# Patient Record
Sex: FEMALE | Race: WHITE | NOT HISPANIC OR LATINO | Employment: UNEMPLOYED | URBAN - METROPOLITAN AREA
[De-identification: names, ages, dates, MRNs, and addresses within clinical notes are randomized per-mention and may not be internally consistent; named-entity substitution may affect disease eponyms.]

---

## 2023-03-03 ENCOUNTER — TELEPHONE (OUTPATIENT)
Dept: OBSTETRICS AND GYNECOLOGY | Facility: CLINIC | Age: 23
End: 2023-03-03
Payer: COMMERCIAL

## 2023-03-03 DIAGNOSIS — Z32.00 POSSIBLE PREGNANCY, NOT CONFIRMED: Primary | ICD-10-CM

## 2023-03-03 NOTE — TELEPHONE ENCOUNTER
Pt states she is unsure of when her last period was but she had a positive pregnancy test today. Informed pt she will need to come in on Monday 3/6 to have hcg lab drawn to try to estimate dating to schedule her new OB. Pt ABBY and will come in Monday before 1630.

## 2023-03-03 NOTE — TELEPHONE ENCOUNTER
Pt calling to schedule a new ob appt and she is unsure of lmp. She has  A faint +UPT but is concerned that the test could be old and not sure if the test was accurate.

## 2023-03-04 ENCOUNTER — APPOINTMENT (OUTPATIENT)
Dept: GENERAL RADIOLOGY | Facility: HOSPITAL | Age: 23
End: 2023-03-04
Payer: COMMERCIAL

## 2023-03-04 ENCOUNTER — HOSPITAL ENCOUNTER (OUTPATIENT)
Facility: HOSPITAL | Age: 23
Setting detail: OBSERVATION
Discharge: HOME | End: 2023-03-05
Attending: EMERGENCY MEDICINE | Admitting: INTERNAL MEDICINE
Payer: COMMERCIAL

## 2023-03-04 DIAGNOSIS — J21.8 ACUTE BRONCHIOLITIS DUE TO OTHER SPECIFIED ORGANISMS: Primary | ICD-10-CM

## 2023-03-04 DIAGNOSIS — B34.8 RHINOVIRUS INFECTION: ICD-10-CM

## 2023-03-04 DIAGNOSIS — E87.1 HYPONATREMIA: ICD-10-CM

## 2023-03-04 DIAGNOSIS — Z87.09 HISTORY OF ASTHMA: ICD-10-CM

## 2023-03-04 DIAGNOSIS — J96.01 ACUTE RESPIRATORY FAILURE WITH HYPOXIA: ICD-10-CM

## 2023-03-04 DIAGNOSIS — R06.2 WHEEZING: ICD-10-CM

## 2023-03-04 PROBLEM — J45.901 MODERATE ACUTE EXACERBATION OF ASTHMA: Status: ACTIVE | Noted: 2023-03-04

## 2023-03-04 LAB
ALBUMIN SERPL-MCNC: 3.9 G/DL (ref 3.5–5.2)
ALBUMIN/GLOB SERPL: 1.4 G/DL
ALP SERPL-CCNC: 75 U/L (ref 39–117)
ALT SERPL W P-5'-P-CCNC: 8 U/L (ref 1–33)
ANION GAP SERPL CALCULATED.3IONS-SCNC: 11 MMOL/L (ref 5–15)
AST SERPL-CCNC: 14 U/L (ref 1–32)
B PARAPERT DNA SPEC QL NAA+PROBE: NOT DETECTED
B PERT DNA SPEC QL NAA+PROBE: NOT DETECTED
B-HCG UR QL: NEGATIVE
BACTERIA UR QL AUTO: ABNORMAL /HPF
BASOPHILS # BLD AUTO: 0.07 10*3/MM3 (ref 0–0.2)
BASOPHILS # BLD AUTO: 0.07 10*3/MM3 (ref 0–0.2)
BASOPHILS NFR BLD AUTO: 0.5 % (ref 0–1.5)
BASOPHILS NFR BLD AUTO: 0.6 % (ref 0–1.5)
BILIRUB SERPL-MCNC: 0.3 MG/DL (ref 0–1.2)
BILIRUB UR QL STRIP: NEGATIVE
BUN BLDA-MCNC: 6 MG/DL
BUN BLDA-MCNC: 6 MG/DL (ref 8–26)
BUN SERPL-MCNC: 6 MG/DL (ref 6–20)
BUN/CREAT SERPL: 10.3 (ref 7–25)
C PNEUM DNA NPH QL NAA+NON-PROBE: NOT DETECTED
CA-I BLDA-SCNC: 1.25 MMOL/L (ref 1.2–1.32)
CALCIUM BLD QL: 1.25 MG/DL
CALCIUM SPEC-SCNC: 8.4 MG/DL (ref 8.6–10.5)
CHLORIDE BLDA-SCNC: 104 MMOL/L (ref 98–109)
CHLORIDE BLDA-SCNC: 104 MMOL/L (ref 98–109)
CHLORIDE SERPL-SCNC: 107 MMOL/L (ref 98–107)
CLARITY UR: ABNORMAL
CO2 BLDA-SCNC: 28 MMOL/L (ref 24–29)
CO2 SERPL-SCNC: 22 MMOL/L (ref 22–29)
COLOR UR: YELLOW
CREAT BLDA-MCNC: 0.6 MG/DL
CREAT BLDA-MCNC: 0.6 MG/DL (ref 0.6–1.3)
CREAT SERPL-MCNC: 0.58 MG/DL (ref 0.57–1)
CRP SERPL-MCNC: 0.69 MG/DL (ref 0–0.5)
D DIMER PPP FEU-MCNC: 0.27 MCGFEU/ML (ref 0–0.5)
DEPRECATED RDW RBC AUTO: 41.8 FL (ref 37–54)
DEPRECATED RDW RBC AUTO: 42.4 FL (ref 37–54)
EGFRCR SERPLBLD CKD-EPI 2021: 129.5 ML/MIN/1.73
EGFRCR SERPLBLD CKD-EPI 2021: 130.6 ML/MIN/1.73
EOSINOPHIL # BLD AUTO: 0.5 10*3/MM3 (ref 0–0.4)
EOSINOPHIL # BLD AUTO: 0.9 10*3/MM3 (ref 0–0.4)
EOSINOPHIL NFR BLD AUTO: 4.4 % (ref 0.3–6.2)
EOSINOPHIL NFR BLD AUTO: 6.9 % (ref 0.3–6.2)
ERYTHROCYTE [DISTWIDTH] IN BLOOD BY AUTOMATED COUNT: 11.9 % (ref 12.3–15.4)
ERYTHROCYTE [DISTWIDTH] IN BLOOD BY AUTOMATED COUNT: 12 % (ref 12.3–15.4)
EXPIRATION DATE: NORMAL
FLUAV SUBTYP SPEC NAA+PROBE: NOT DETECTED
FLUBV RNA ISLT QL NAA+PROBE: NOT DETECTED
GLOBULIN UR ELPH-MCNC: 2.7 GM/DL
GLUCOSE BLDC GLUCOMTR-MCNC: 107 MG/DL (ref 70–130)
GLUCOSE BLDC GLUCOMTR-MCNC: 107 MG/DL (ref 70–130)
GLUCOSE SERPL-MCNC: 112 MG/DL (ref 65–99)
GLUCOSE UR STRIP-MCNC: NEGATIVE MG/DL
HADV DNA SPEC NAA+PROBE: NOT DETECTED
HCOV 229E RNA SPEC QL NAA+PROBE: NOT DETECTED
HCOV HKU1 RNA SPEC QL NAA+PROBE: NOT DETECTED
HCOV NL63 RNA SPEC QL NAA+PROBE: NOT DETECTED
HCOV OC43 RNA SPEC QL NAA+PROBE: NOT DETECTED
HCT VFR BLD AUTO: 38.6 % (ref 34–46.6)
HCT VFR BLD AUTO: 42.4 % (ref 34–46.6)
HCT VFR BLDA CALC: 42 % (ref 38–51)
HGB BLD-MCNC: 12.8 G/DL (ref 12–15.9)
HGB BLD-MCNC: 14.1 G/DL (ref 12–15.9)
HGB BLDA-MCNC: 14.3 G/DL (ref 12–17)
HGB UR QL STRIP.AUTO: ABNORMAL
HMPV RNA NPH QL NAA+NON-PROBE: NOT DETECTED
HPIV1 RNA ISLT QL NAA+PROBE: NOT DETECTED
HPIV2 RNA SPEC QL NAA+PROBE: NOT DETECTED
HPIV3 RNA NPH QL NAA+PROBE: NOT DETECTED
HPIV4 P GENE NPH QL NAA+PROBE: NOT DETECTED
HYALINE CASTS UR QL AUTO: ABNORMAL /LPF
IMM GRANULOCYTES # BLD AUTO: 0.04 10*3/MM3 (ref 0–0.05)
IMM GRANULOCYTES # BLD AUTO: 0.04 10*3/MM3 (ref 0–0.05)
IMM GRANULOCYTES NFR BLD AUTO: 0.3 % (ref 0–0.5)
IMM GRANULOCYTES NFR BLD AUTO: 0.4 % (ref 0–0.5)
INTERNAL NEGATIVE CONTROL: NEGATIVE
INTERNAL POSITIVE CONTROL: POSITIVE
KETONES UR QL STRIP: ABNORMAL
LDH SERPL-CCNC: 283 U/L (ref 135–214)
LEUKOCYTE ESTERASE UR QL STRIP.AUTO: NEGATIVE
LYMPHOCYTES # BLD AUTO: 2.67 10*3/MM3 (ref 0.7–3.1)
LYMPHOCYTES # BLD AUTO: 5.22 10*3/MM3 (ref 0.7–3.1)
LYMPHOCYTES NFR BLD AUTO: 23.7 % (ref 19.6–45.3)
LYMPHOCYTES NFR BLD AUTO: 40.3 % (ref 19.6–45.3)
Lab: NORMAL
M PNEUMO IGG SER IA-ACNC: NOT DETECTED
MAGNESIUM SERPL-MCNC: 1.9 MG/DL (ref 1.6–2.6)
MCH RBC QN AUTO: 31.9 PG (ref 26.6–33)
MCH RBC QN AUTO: 32 PG (ref 26.6–33)
MCHC RBC AUTO-ENTMCNC: 33.2 G/DL (ref 31.5–35.7)
MCHC RBC AUTO-ENTMCNC: 33.3 G/DL (ref 31.5–35.7)
MCV RBC AUTO: 96.3 FL (ref 79–97)
MCV RBC AUTO: 96.4 FL (ref 79–97)
MONOCYTES # BLD AUTO: 0.32 10*3/MM3 (ref 0.1–0.9)
MONOCYTES # BLD AUTO: 0.72 10*3/MM3 (ref 0.1–0.9)
MONOCYTES NFR BLD AUTO: 2.8 % (ref 5–12)
MONOCYTES NFR BLD AUTO: 5.6 % (ref 5–12)
NEUTROPHILS NFR BLD AUTO: 46.4 % (ref 42.7–76)
NEUTROPHILS NFR BLD AUTO: 6.01 10*3/MM3 (ref 1.7–7)
NEUTROPHILS NFR BLD AUTO: 68.1 % (ref 42.7–76)
NEUTROPHILS NFR BLD AUTO: 7.66 10*3/MM3 (ref 1.7–7)
NITRITE UR QL STRIP: NEGATIVE
NRBC BLD AUTO-RTO: 0 /100 WBC (ref 0–0.2)
NRBC BLD AUTO-RTO: 0 /100 WBC (ref 0–0.2)
PH UR STRIP.AUTO: 5.5 [PH] (ref 5–8)
PLATELET # BLD AUTO: 302 10*3/MM3 (ref 140–450)
PLATELET # BLD AUTO: 317 10*3/MM3 (ref 140–450)
PMV BLD AUTO: 10.5 FL (ref 6–12)
PMV BLD AUTO: 10.9 FL (ref 6–12)
POTASSIUM BLDA-SCNC: 3.4 MMOL/L (ref 3.5–4.9)
POTASSIUM BLDA-SCNC: 3.4 MMOL/L (ref 3.5–4.9)
POTASSIUM SERPL-SCNC: 4 MMOL/L (ref 3.5–5.2)
PROCALCITONIN SERPL-MCNC: 0.02 NG/ML (ref 0–0.25)
PROT SERPL-MCNC: 6.6 G/DL (ref 6–8.5)
PROT UR QL STRIP: ABNORMAL
QT INTERVAL: 366 MS
QTC INTERVAL: 442 MS
RBC # BLD AUTO: 4.01 10*6/MM3 (ref 3.77–5.28)
RBC # BLD AUTO: 4.4 10*6/MM3 (ref 3.77–5.28)
RBC # UR STRIP: ABNORMAL /HPF
REF LAB TEST METHOD: ABNORMAL
RHINOVIRUS RNA SPEC NAA+PROBE: DETECTED
RSV RNA NPH QL NAA+NON-PROBE: NOT DETECTED
SARS-COV-2 RNA NPH QL NAA+NON-PROBE: NOT DETECTED
SODIUM BLD-SCNC: 124 MMOL/L (ref 138–146)
SODIUM BLD-SCNC: 141 MMOL/L (ref 138–146)
SODIUM SERPL-SCNC: 140 MMOL/L (ref 136–145)
SP GR UR STRIP: >=1.03 (ref 1–1.03)
SQUAMOUS #/AREA URNS HPF: ABNORMAL /HPF
UROBILINOGEN UR QL STRIP: ABNORMAL
WBC # UR STRIP: ABNORMAL /HPF
WBC NRBC COR # BLD: 11.26 10*3/MM3 (ref 3.4–10.8)
WBC NRBC COR # BLD: 12.96 10*3/MM3 (ref 3.4–10.8)

## 2023-03-04 PROCEDURE — 96374 THER/PROPH/DIAG INJ IV PUSH: CPT

## 2023-03-04 PROCEDURE — 25010000002 METHYLPREDNISOLONE PER 40 MG: Performed by: PHYSICIAN ASSISTANT

## 2023-03-04 PROCEDURE — 94664 DEMO&/EVAL PT USE INHALER: CPT

## 2023-03-04 PROCEDURE — 99223 1ST HOSP IP/OBS HIGH 75: CPT | Performed by: INTERNAL MEDICINE

## 2023-03-04 PROCEDURE — 99284 EMERGENCY DEPT VISIT MOD MDM: CPT

## 2023-03-04 PROCEDURE — 94799 UNLISTED PULMONARY SVC/PX: CPT

## 2023-03-04 PROCEDURE — 80053 COMPREHEN METABOLIC PANEL: CPT | Performed by: INTERNAL MEDICINE

## 2023-03-04 PROCEDURE — 94640 AIRWAY INHALATION TREATMENT: CPT

## 2023-03-04 PROCEDURE — 96376 TX/PRO/DX INJ SAME DRUG ADON: CPT

## 2023-03-04 PROCEDURE — 87086 URINE CULTURE/COLONY COUNT: CPT | Performed by: PHYSICIAN ASSISTANT

## 2023-03-04 PROCEDURE — 85379 FIBRIN DEGRADATION QUANT: CPT | Performed by: PHYSICIAN ASSISTANT

## 2023-03-04 PROCEDURE — 83615 LACTATE (LD) (LDH) ENZYME: CPT | Performed by: PHYSICIAN ASSISTANT

## 2023-03-04 PROCEDURE — G0378 HOSPITAL OBSERVATION PER HR: HCPCS

## 2023-03-04 PROCEDURE — 85025 COMPLETE CBC W/AUTO DIFF WBC: CPT | Performed by: INTERNAL MEDICINE

## 2023-03-04 PROCEDURE — 93005 ELECTROCARDIOGRAM TRACING: CPT | Performed by: PHYSICIAN ASSISTANT

## 2023-03-04 PROCEDURE — 81025 URINE PREGNANCY TEST: CPT | Performed by: PHYSICIAN ASSISTANT

## 2023-03-04 PROCEDURE — 83735 ASSAY OF MAGNESIUM: CPT | Performed by: INTERNAL MEDICINE

## 2023-03-04 PROCEDURE — 85014 HEMATOCRIT: CPT

## 2023-03-04 PROCEDURE — 84145 PROCALCITONIN (PCT): CPT | Performed by: PHYSICIAN ASSISTANT

## 2023-03-04 PROCEDURE — 0202U NFCT DS 22 TRGT SARS-COV-2: CPT | Performed by: PHYSICIAN ASSISTANT

## 2023-03-04 PROCEDURE — 80047 BASIC METABLC PNL IONIZED CA: CPT

## 2023-03-04 PROCEDURE — 99285 EMERGENCY DEPT VISIT HI MDM: CPT

## 2023-03-04 PROCEDURE — 25010000002 METHYLPREDNISOLONE PER 40 MG: Performed by: INTERNAL MEDICINE

## 2023-03-04 PROCEDURE — 85025 COMPLETE CBC W/AUTO DIFF WBC: CPT | Performed by: PHYSICIAN ASSISTANT

## 2023-03-04 PROCEDURE — 81001 URINALYSIS AUTO W/SCOPE: CPT | Performed by: PHYSICIAN ASSISTANT

## 2023-03-04 PROCEDURE — 86140 C-REACTIVE PROTEIN: CPT | Performed by: PHYSICIAN ASSISTANT

## 2023-03-04 PROCEDURE — 71045 X-RAY EXAM CHEST 1 VIEW: CPT

## 2023-03-04 RX ORDER — METHYLPREDNISOLONE SODIUM SUCCINATE 40 MG/ML
80 INJECTION, POWDER, LYOPHILIZED, FOR SOLUTION INTRAMUSCULAR; INTRAVENOUS ONCE
Status: COMPLETED | OUTPATIENT
Start: 2023-03-04 | End: 2023-03-04

## 2023-03-04 RX ORDER — SODIUM CHLORIDE 0.9 % (FLUSH) 0.9 %
10 SYRINGE (ML) INJECTION AS NEEDED
Status: DISCONTINUED | OUTPATIENT
Start: 2023-03-04 | End: 2023-03-05 | Stop reason: HOSPADM

## 2023-03-04 RX ORDER — SODIUM CHLORIDE 9 MG/ML
40 INJECTION, SOLUTION INTRAVENOUS AS NEEDED
Status: DISCONTINUED | OUTPATIENT
Start: 2023-03-04 | End: 2023-03-05 | Stop reason: HOSPADM

## 2023-03-04 RX ORDER — NICOTINE 21 MG/24HR
1 PATCH, TRANSDERMAL 24 HOURS TRANSDERMAL
Status: DISCONTINUED | OUTPATIENT
Start: 2023-03-04 | End: 2023-03-05 | Stop reason: HOSPADM

## 2023-03-04 RX ORDER — SODIUM CHLORIDE 0.9 % (FLUSH) 0.9 %
10 SYRINGE (ML) INJECTION EVERY 12 HOURS SCHEDULED
Status: DISCONTINUED | OUTPATIENT
Start: 2023-03-04 | End: 2023-03-05 | Stop reason: HOSPADM

## 2023-03-04 RX ORDER — IPRATROPIUM BROMIDE AND ALBUTEROL SULFATE 2.5; .5 MG/3ML; MG/3ML
3 SOLUTION RESPIRATORY (INHALATION) EVERY 4 HOURS PRN
Status: DISCONTINUED | OUTPATIENT
Start: 2023-03-04 | End: 2023-03-05 | Stop reason: HOSPADM

## 2023-03-04 RX ORDER — METHYLPREDNISOLONE SODIUM SUCCINATE 40 MG/ML
20 INJECTION, POWDER, LYOPHILIZED, FOR SOLUTION INTRAMUSCULAR; INTRAVENOUS EVERY 8 HOURS
Status: DISCONTINUED | OUTPATIENT
Start: 2023-03-04 | End: 2023-03-05 | Stop reason: HOSPADM

## 2023-03-04 RX ORDER — ONDANSETRON 2 MG/ML
4 INJECTION INTRAMUSCULAR; INTRAVENOUS EVERY 6 HOURS PRN
Status: DISCONTINUED | OUTPATIENT
Start: 2023-03-04 | End: 2023-03-05 | Stop reason: HOSPADM

## 2023-03-04 RX ORDER — ACETAMINOPHEN 325 MG/1
650 TABLET ORAL EVERY 4 HOURS PRN
Status: DISCONTINUED | OUTPATIENT
Start: 2023-03-04 | End: 2023-03-05 | Stop reason: HOSPADM

## 2023-03-04 RX ORDER — BUDESONIDE 0.5 MG/2ML
0.5 INHALANT ORAL
Status: DISCONTINUED | OUTPATIENT
Start: 2023-03-04 | End: 2023-03-05 | Stop reason: HOSPADM

## 2023-03-04 RX ORDER — IPRATROPIUM BROMIDE AND ALBUTEROL SULFATE 2.5; .5 MG/3ML; MG/3ML
3 SOLUTION RESPIRATORY (INHALATION)
Status: DISCONTINUED | OUTPATIENT
Start: 2023-03-04 | End: 2023-03-05 | Stop reason: HOSPADM

## 2023-03-04 RX ORDER — ALBUTEROL SULFATE 2.5 MG/3ML
2.5 SOLUTION RESPIRATORY (INHALATION) ONCE
Status: COMPLETED | OUTPATIENT
Start: 2023-03-04 | End: 2023-03-04

## 2023-03-04 RX ADMIN — IPRATROPIUM BROMIDE AND ALBUTEROL SULFATE 3 ML: 2.5; .5 SOLUTION RESPIRATORY (INHALATION) at 08:55

## 2023-03-04 RX ADMIN — IPRATROPIUM BROMIDE AND ALBUTEROL SULFATE 3 ML: 2.5; .5 SOLUTION RESPIRATORY (INHALATION) at 14:43

## 2023-03-04 RX ADMIN — IPRATROPIUM BROMIDE AND ALBUTEROL SULFATE 3 ML: 2.5; .5 SOLUTION RESPIRATORY (INHALATION) at 20:36

## 2023-03-04 RX ADMIN — METHYLPREDNISOLONE SODIUM SUCCINATE 20 MG: 40 INJECTION, POWDER, FOR SOLUTION INTRAMUSCULAR; INTRAVENOUS at 21:24

## 2023-03-04 RX ADMIN — Medication 10 ML: at 09:45

## 2023-03-04 RX ADMIN — BUDESONIDE 0.5 MG: 0.5 SUSPENSION RESPIRATORY (INHALATION) at 14:43

## 2023-03-04 RX ADMIN — BUDESONIDE 0.5 MG: 0.5 SUSPENSION RESPIRATORY (INHALATION) at 20:36

## 2023-03-04 RX ADMIN — ALBUTEROL SULFATE 2.5 MG: 2.5 SOLUTION RESPIRATORY (INHALATION) at 01:43

## 2023-03-04 RX ADMIN — METHYLPREDNISOLONE SODIUM SUCCINATE 20 MG: 40 INJECTION, POWDER, FOR SOLUTION INTRAMUSCULAR; INTRAVENOUS at 15:51

## 2023-03-04 RX ADMIN — Medication 10 ML: at 19:53

## 2023-03-04 RX ADMIN — SODIUM CHLORIDE 1000 ML: 9 INJECTION, SOLUTION INTRAVENOUS at 04:19

## 2023-03-04 RX ADMIN — Medication 1 PATCH: at 15:50

## 2023-03-04 RX ADMIN — METHYLPREDNISOLONE SODIUM SUCCINATE 80 MG: 40 INJECTION, POWDER, FOR SOLUTION INTRAMUSCULAR; INTRAVENOUS at 04:19

## 2023-03-04 NOTE — PLAN OF CARE
Goal Outcome Evaluation:   Some complaints of shortness of breath this shift but improving per patient. IV steroids and nebulizer's given per MAR. VSS, NSR to sinus tachycardia on telemetry

## 2023-03-04 NOTE — H&P
"    [facility] Medicine Services  HISTORY AND PHYSICAL    Patient Name: Jing Mead  : 2000  MRN: [id number]  Primary Care Physician: Provider, No Known  Date of admission: 3/4/2023      Subjective   Subjective     Chief Complaint:  Dyspnea, wheezing    HPI:  Jing Mead is a 22 y.o. female who states that for the last 7 days she has noticed increased frequency of shortness of breath, cough, and sensation of wheezing.  She states that her daughter has had a common cold over the last week, but is not aware of any other sick contacts.  She states that her cough is occasionally productive of clear sputum, no los blood.  She feels the wheezing is worsened over the aforementioned time frame.  She also confirms that she \"coughed so much and so hard but almost passed out earlier tonight.\"  Because of this, she called EMS to bring her to the ED for further evaluation.  The ED provider reports that the patient arrived on room air with O2 saturations of 88% which improved with O2 delivery by nasal cannula.  That provider also states the patient was taken off of the O2 during her ER stay, but quickly went down to 91%.  During my visit the patient states she does not feel short of breath but she is still wheezing.  She states she has a history of childhood asthma, but was taken off of her home nebulizer and albuterol inhaler treatments at age 15.  She does not see a PCP.  She denies chest pain, fever/chills, nausea/emesis, anosmia/ageusia, abdominal pain, bowel habit change, focal neurologic deficit of any kind, or syncope.  She states her medical history is significant only for the aforementioned childhood asthma, and that she has no other medical diagnoses and takes no other medications.      Review of Systems   Constitutional: Negative.    HENT: Positive for sore throat.    Eyes: Negative.    Respiratory: Positive for cough, shortness of breath and wheezing.    Cardiovascular: Negative. " "   Gastrointestinal: Negative.    Endocrine: Negative.    Genitourinary: Negative.    Musculoskeletal: Negative.    Skin: Negative.    Allergic/Immunologic: Negative.    Neurological: Negative.    Hematological: Negative.    Psychiatric/Behavioral: Negative.           Personal History     Medical history is as above in the HPI.          History reviewed. No pertinent surgical history.    Family History: She does not know her father's medical history.  Mother has COPD/emphysema.    Social History:  She states no alcohol or drug use.  She smokes daily.  Social History     Social History Narrative   • Not on file       Medications:  Available home medication information reviewed.  (Not in a hospital admission)      No Known Allergies    Objective   Objective     Vital Signs:   Temp:  [98.5 °F (36.9 °C)] 98.5 °F (36.9 °C)  Heart Rate:  [] 74  Resp:  [16-18] 16  BP: (112-125)/(72-83) 116/72  Flow (L/min):  [1] 1       Physical Exam   Constitutional: Awake, alert, NAD, pleasant.  Eyes: PERRLA, sclerae anicteric, no conjunctival injection  HENT: NCAT, mucous membranes moist  Neck: Supple, no thyromegaly, no lymphadenopathy, trachea midline  Respiratory: Mild diffuse wheeze and muffled/\"distant\" sounds bilaterally, nonlabored respirations; during my visit she is 100% on room air.  Cardiovascular: RRR, no murmurs, rubs, or gallops, palpable pedal pulses bilaterally  Gastrointestinal: Positive bowel sounds, soft, nontender, nondistended  Musculoskeletal: No bilateral ankle edema, no clubbing or cyanosis to extremities  Psychiatric: Appropriate affect, cooperative  Neurologic: Oriented x 3, strength symmetric in all extremities, Cranial Nerves grossly intact to confrontation, speech clear  Skin: No rashes, normal turgor.    Result Review:  I have personally reviewed the results from the time of this admission to 3/4/2023 04:57 EST and agree with these findings:  [x]  Laboratory list / accordion  []  Microbiology  [x]  " Radiology  []  EKG/Telemetry   []  Cardiology/Vascular   []  Pathology  []  Old records  []  Other:  Most notable findings include: In terms of her laboratory results that she has a sodium of 141 from the study drawn at 0200, there is a subsequent study drawn 6 minutes later which shows a sodium of 124, but I am told by the ED physician that this was a point-of-care lab draw due to a malfunctioning chemistry analyzer in the lab.  I cannot access her EKG due to the software malfunction, but in room telemetry shows normal sinus rhythm with ventricular rate approximately 80 bpm.  I personally reviewed chest x-ray which is a single AP view of the chest and by my read shows no acute infiltrate/edema.        LAB RESULTS:      Lab 03/04/23  0200 03/04/23  0155   WBC  --  12.96*   HEMOGLOBIN  --  14.1   HEMOGLOBIN, POC 14.3  --    HEMATOCRIT  --  42.4   HEMATOCRIT POC 42  --    PLATELETS  --  317   NEUTROS ABS  --  6.01   IMMATURE GRANS (ABS)  --  0.04   LYMPHS ABS  --  5.22*   MONOS ABS  --  0.72   EOS ABS  --  0.90*   MCV  --  96.4   CRP  --  0.69*   PROCALCITONIN  --  0.02   LDH  --  283*   D DIMER QUANT  --  0.27         Lab 03/04/23  0206 03/04/23  0200   CREATININE 0.60 0.60   EGFR  --  129.5                         UA    Urinalysis 3/4/23 3/4/23    0127 0127   Squamous Epithelial Cells, UA  7-12 (A)   Specific Gravity, UA >=1.030    Ketones, UA Trace (A)    Blood, UA Trace (A)    Leukocytes, UA Negative    Nitrite, UA Negative    RBC, UA  7-12 (A)   WBC, UA  13-20 (A)   Bacteria, UA  None Seen   (A) Abnormal value              Microbiology Results (last 10 days)     Procedure Component Value - Date/Time    COVID PRE-OP / PRE-PROCEDURE SCREENING ORDER (NO ISOLATION) - Swab, Nasopharynx [[id number]]  (Abnormal) Collected: 03/04/23 0201    Lab Status: Final result Specimen: Swab from Nasopharynx Updated: 03/04/23 0325    Narrative:      The following orders were created for panel order COVID PRE-OP / PRE-PROCEDURE  SCREENING ORDER (NO ISOLATION) - Swab, Nasopharynx.  Procedure                               Abnormality         Status                     ---------                               -----------         ------                     Respiratory Panel PCR w/...[[id number]]  Abnormal            Final result                 Please view results for these tests on the individual orders.    Respiratory Panel PCR w/COVID-19(SARS-CoV-2) MIGEL/LAZARO/JAVIER/PAD/COR/MAD/GINNY In-House, NP Swab in UTM/VTM, 3-4 HR TAT - Swab, Nasopharynx [[id number]]  (Abnormal) Collected: 03/04/23 0201    Lab Status: Final result Specimen: Swab from Nasopharynx Updated: 03/04/23 0325     ADENOVIRUS, PCR Not Detected     Coronavirus 229E Not Detected     Coronavirus HKU1 Not Detected     Coronavirus NL63 Not Detected     Coronavirus OC43 Not Detected     COVID19 Not Detected     Human Metapneumovirus Not Detected     Human Rhinovirus/Enterovirus Detected     Influenza A PCR Not Detected     Influenza B PCR Not Detected     Parainfluenza Virus 1 Not Detected     Parainfluenza Virus 2 Not Detected     Parainfluenza Virus 3 Not Detected     Parainfluenza Virus 4 Not Detected     RSV, PCR Not Detected     Bordetella pertussis pcr Not Detected     Bordetella parapertussis PCR Not Detected     Chlamydophila pneumoniae PCR Not Detected     Mycoplasma pneumo by PCR Not Detected    Narrative:      In the setting of a positive respiratory panel with a viral infection PLUS a negative procalcitonin without other underlying concern for bacterial infection, consider observing off antibiotics or discontinuation of antibiotics and continue supportive care. If the respiratory panel is positive for atypical bacterial infection (Bordetella pertussis, Chlamydophila pneumoniae, or Mycoplasma pneumoniae), consider antibiotic de-escalation to target atypical bacterial infection.          XR Chest 1 View    Result Date: 3/4/2023  EXAMINATION: XR CHEST 1 VW  DATE OF EXAM: 3/4/2023 1:55  AM SLOT: 60 HISTORY: Shortness of breath COMPARISON: None. FINDINGS: HEART/MEDIASTINUM: Normal sized cardiac silhouette. LUNGS/PLEURA: Clear. MUSCULOSKELETAL: No acute osseous pathology.     Impression: 1.  No acute cardiopulmonary abnormality detected.  Electronically signed by:  Jez Gonzáles M.D.  3/4/2023 1:03 AM Mountain Time          Assessment & Plan   Assessment & Plan     Active Hospital Problems    Diagnosis  POA   • **Moderate acute exacerbation of asthma [J45.901]  Yes       22F with acute exacerbation of asthma    Acute exacerbation of asthma  - DuoNeb treatments every 6 hours scheduled, Every 4 hours as needed.  - Continue O2 as needed by nasal cannula to maintain saturations >92%; during my visit in the ED she is 100% on room air.  - She is still wheezing on exam, so we will start Solu-Medrol 20 mg IV every 8 hours.  - We will defer any antibiotic therapy at this time; nasal swab positive for rhinovirus.    Tobacco abuse  - Cessation advised/counseled.  - She declines transdermal nicotine at this time.    Total time spent: 75 minutes  Time spent includes time reviewing chart, face-to-face time, counseling patient/family/caregiver, ordering medications/tests/procedures, communicating with other health care professionals, documenting clinical information in the electronic health record, and coordination of care.    DVT prophylaxis:  SCDs      CODE STATUS:  full  Code Status and Medical Interventions:   Ordered at: 03/04/23 0419     Level Of Support Discussed With:    Patient     Code Status (Patient has no pulse and is not breathing):    CPR (Attempt to Resuscitate)     Medical Interventions (Patient has pulse or is breathing):    Full Support       Expected Discharge 3/4/2023       Tony Pan III, DO  03/04/23

## 2023-03-04 NOTE — PROGRESS NOTES
[facility] Medicine Services  PROGRESS NOTE    Patient Name: Jing Mead  : 2000  MRN: [id number]    Date of Admission: 3/4/2023  Primary Care Physician: Provider, No Known    Subjective   Subjective     CC:  Asthma exacerbation    HPI:  Lungs still feel tight, but better than last night. Coughing. No fever.    ROS:  Gen: no fever  Pulm: cough    Objective   Objective     Vital Signs:   Temp:  [97.7 °F (36.5 °C)-98.5 °F (36.9 °C)] 98.1 °F (36.7 °C)  Heart Rate:  [] 91  Resp:  [16-18] 18  BP: (110-125)/(60-83) 110/60  Flow (L/min):  [1] 1     Physical Exam:  Constitutional - no acute distress, nontoxic, in bed  HEENT-NCAT, mucous membranes moist  CV-RRR  Resp-diminished at bases with scattered expiratory wheezes  Abd-soft, nontender, nondistended, normoactive bowel sounds  Ext-No lower extremity cyanosis, clubbing or edema bilaterally  Neuro-alert and oriented, speech clear, moves all extremities   Psych-normal affect   Skin- No rash on exposed UE or LE bilaterally      Results Reviewed:  LAB RESULTS:      Lab 23  0616 23  0200 23  0155   WBC 11.26*  --  12.96*   HEMOGLOBIN 12.8  --  14.1   HEMOGLOBIN, POC  --  14.3  --    HEMATOCRIT 38.6  --  42.4   HEMATOCRIT POC  --  42  --    PLATELETS 302  --  317   NEUTROS ABS 7.66*  --  6.01   IMMATURE GRANS (ABS) 0.04  --  0.04   LYMPHS ABS 2.67  --  5.22*   MONOS ABS 0.32  --  0.72   EOS ABS 0.50*  --  0.90*   MCV 96.3  --  96.4   CRP  --   --  0.69*   PROCALCITONIN  --   --  0.02   LDH  --   --  283*   D DIMER QUANT  --   --  0.27         Lab 23  0616 23  0206 23  0200   SODIUM 140  --   --    POTASSIUM 4.0  --   --    CHLORIDE 107  --   --    CO2 22.0  --   --    ANION GAP 11.0  --   --    BUN 6  --   --    CREATININE 0.58 0.60 0.60   EGFR 130.6  --  129.5   GLUCOSE 112*  --   --    CALCIUM 8.4*  --   --    MAGNESIUM 1.9  --   --          Lab 23  0616   TOTAL PROTEIN 6.6   ALBUMIN 3.9    GLOBULIN 2.7   ALT (SGPT) 8   AST (SGOT) 14   BILIRUBIN 0.3   ALK PHOS 75                     Brief Urine Lab Results  (Last result in the past 365 days)      Color   Clarity   Blood   Leuk Est   Nitrite   Protein   CREAT   Urine HCG        03/04/23 0201               Negative             Microbiology Results Abnormal     None          XR Chest 1 View    Result Date: 3/4/2023  EXAMINATION: XR CHEST 1 VW  DATE OF EXAM: 3/4/2023 1:55 AM SLOT: 60 HISTORY: Shortness of breath COMPARISON: None. FINDINGS: HEART/MEDIASTINUM: Normal sized cardiac silhouette. LUNGS/PLEURA: Clear. MUSCULOSKELETAL: No acute osseous pathology.     Impression: 1.  No acute cardiopulmonary abnormality detected.  Electronically signed by:  Jez Gonzáles M.D.  3/4/2023 1:03 AM Mountain Time          Current medications:  Scheduled Meds:ipratropium-albuterol, 3 mL, Nebulization, Q6H While Awake - RT  methylPREDNISolone sodium succinate, 20 mg, Intravenous, Q8H  nicotine, 1 patch, Transdermal, Q24H  sodium chloride, 10 mL, Intravenous, Q12H      Continuous Infusions:   PRN Meds:.•  acetaminophen  •  ipratropium-albuterol  •  ondansetron  •  [COMPLETED] Insert Peripheral IV **AND** sodium chloride  •  sodium chloride  •  sodium chloride    Assessment & Plan   Assessment & Plan     Active Hospital Problems    Diagnosis  POA   • **Moderate acute exacerbation of asthma [J45.901]  Yes      Resolved Hospital Problems   No resolved problems to display.        Brief Hospital Course to date:  Jing Mead is a 22 y.o. female with history of asthma and tobacco abuse presents with shortness of breath, wheezing and coughing.    Asthma exacerbation  Rhinovirus +  - solumedrol IV  - scheduled duonebs and budesonide  - will need inhalers and steroid taper at discharge  - will also refer to new PCP to establish care.  Needs PFTs as an outpatient.    Tobacco abuse  - counseled the importance of stopping smoking, particularly in the setting of asthma  -  nicotine replacement.      Expected Discharge Location and Transportation: home  Expected Discharge        DVT prophylaxis:  Mechanical DVT prophylaxis orders are present.     AM-PAC 6 Clicks Score (PT): 24 (03/04/23 4812)    CODE STATUS:   Code Status and Medical Interventions:   Ordered at: 03/04/23 0419     Level Of Support Discussed With:    Patient     Code Status (Patient has no pulse and is not breathing):    CPR (Attempt to Resuscitate)     Medical Interventions (Patient has pulse or is breathing):    Full Support       Oni Lopez MD  03/04/23

## 2023-03-04 NOTE — ED PROVIDER NOTES
"Subjective   History of Present Illness  This is a 22-year-old female that presents the ER with recent viral respiratory infection that started 8 days ago.  Patient's sister has had URI symptoms, as well.  Patient reports rhinorrhea, nasal congestion, and cough with thick yellow to green sputum.  She has history of childhood asthma but says that she does not utilize any inhalers or nebulizer treatment and PCP had told her that she had \"outgrown it\".  Patient says that upper respiratory symptoms have moved into her lungs.  She has had 2-day history of chest congestion, tightness, and wheezing.  She also reports pleuritic chest pain to both lungs with deep inspiration and coughing.  She denies any fever or chills or loss of taste/smell.  She reports personal history of COVID-19 in November, 2021 without sequela.  She is up-to-date on COVID-19 vaccinations.  Patient says last menstrual period is unknown but she believes that she could be pregnant.  She took a recent home pregnancy test and says that it looked like it had a faint positive.  She denies any dysuria or urgency but has had some frequency.  She denies any vaginal bleeding or discharge.  No other concerns at this time.  O2 sat was 89% upon arrival per EMS and patient had a DuoNeb treatment in route.    History provided by:  Patient  Shortness of Breath  Severity:  Moderate  Duration:  8 days  Timing:  Constant  Progression:  Worsening  Chronicity:  New  Context: URI and weather changes    Context comment:  Patient has had 8-day history of URI symptoms that have now moved into chest congestion.  She reports chest tightness, wheezing, and shortness of breath worsening this evening.  History of asthma.  Relieved by:  Nothing  Worsened by:  Deep breathing, coughing and activity  Ineffective treatments: Duo neb in route per EMS.  Associated symptoms: chest pain (Pleuritic chest pain bilaterally with deep inspiration and cough), cough, hemoptysis (Blood-tinged " sputum), sputum production (Thick yellow to green sputum) and wheezing    Associated symptoms: no abdominal pain, no fever, no headaches, no sore throat, no syncope and no vomiting        Review of Systems   Constitutional: Positive for fatigue. Negative for appetite change, chills and fever.   HENT: Positive for congestion, postnasal drip and rhinorrhea. Negative for sinus pressure, sinus pain, sneezing and sore throat.         History of COVID-19 in November, 2021 without sequela.  Up-to-date on COVID-19 vaccinations.  Hoarseness secondary to frequent coughing.   Respiratory: Positive for cough, hemoptysis (Blood-tinged sputum), sputum production (Thick yellow to green sputum), chest tightness, shortness of breath and wheezing.         History of childhood asthma.  Non-smoker.   Cardiovascular: Positive for chest pain (Pleuritic chest pain bilaterally with deep inspiration and cough). Negative for palpitations, leg swelling and syncope.   Gastrointestinal: Negative.  Negative for abdominal pain, constipation, diarrhea, nausea and vomiting.   Genitourinary: Positive for frequency. Negative for dysuria, flank pain, urgency, vaginal bleeding and vaginal discharge.        LMP: Unknown.  Patient took a home pregnancy test and reports a faint positive.   Musculoskeletal: Negative.  Negative for back pain.   Neurological: Negative.  Negative for dizziness, syncope and headaches.   All other systems reviewed and are negative.      History reviewed. No pertinent past medical history.    No Known Allergies    History reviewed. No pertinent surgical history.    History reviewed. No pertinent family history.    Social History     Socioeconomic History   • Marital status: Single           Objective   Physical Exam  Vitals and nursing note reviewed.   Constitutional:       General: She is not in acute distress.     Appearance: Normal appearance. She is ill-appearing. She is not toxic-appearing or diaphoretic.      Comments:  Ill-appearing.  No acute sign of distress.  Nontoxic.   HENT:      Head: Normocephalic and atraumatic.      Right Ear: Tympanic membrane normal.      Left Ear: Tympanic membrane normal.      Ears:      Comments: Bilateral TMs are clear     Nose: Congestion and rhinorrhea present.      Right Sinus: No maxillary sinus tenderness or frontal sinus tenderness.      Left Sinus: No maxillary sinus tenderness or frontal sinus tenderness.      Mouth/Throat:      Mouth: Mucous membranes are moist.      Pharynx: Oropharynx is clear. No pharyngeal swelling, oropharyngeal exudate or posterior oropharyngeal erythema.      Comments: Oral mucous membranes are moist and posterior pharynx is nonerythematous  Eyes:      Extraocular Movements: Extraocular movements intact.      Conjunctiva/sclera: Conjunctivae normal.      Pupils: Pupils are equal, round, and reactive to light.   Cardiovascular:      Rate and Rhythm: Regular rhythm. Tachycardia present.  No extrasystoles are present.     Pulses: Normal pulses.      Heart sounds: Normal heart sounds.      Comments: Mild tachycardia.  No ectopy.  Pulmonary:      Effort: Pulmonary effort is normal. Tachypnea present. No retractions.      Breath sounds: Decreased breath sounds and wheezing present. No rhonchi.      Comments: Mild tachypnea with conversation.  Nonproductive cough on exam.  Hoarseness noted.  No retractions or accessory muscle use.  Bilateral inspiratory and expiratory wheezes.  Chest tightness with decreased breath sounds bilaterally.  No respiratory distress.  Abdominal:      General: Bowel sounds are normal. There is no distension.      Palpations: Abdomen is soft.      Tenderness: There is no abdominal tenderness. There is no right CVA tenderness, left CVA tenderness, guarding or rebound.      Comments: Abdomen soft and nontender.  No flank or CVA tenderness.   Musculoskeletal:         General: Normal range of motion.      Cervical back: Normal range of motion and neck  supple.      Right lower leg: No edema.      Left lower leg: No edema.   Skin:     General: Skin is warm and dry.   Neurological:      General: No focal deficit present.      Mental Status: She is alert.         Procedures           ED Course  ED Course as of 03/04/23 0555   Sat Mar 04, 2023   0546 EKG reveals no evidence of ectopy or arrhythmia.  CBC shows white blood cell count of 12.96 with normal differential.  Lab instrumentation was down for quite some time so we performed POC Chem-8.  Sodium was 124.  Patient does not take any routine daily medications which would induce hyponatremia.  We will repeat chemistries once the lab instrumentation is back up.  Patient's urine pregnancy was negative.  Respiratory PCR panel tested positive for human rhinovirus/enterovirus.  Urinalysis revealed 13-20 white blood cells, 7-12 red blood cells, no bacteria, negative nitrite negative leukocytes.  D-dimer was normal at 0.27.  Procalcitonin is also normal.  CRP is 0.69 and LDH is 283.  We gave patient albuterol neb, Solu-Medrol 80 mg IV.  Upon reassessment, she is feeling mildly improved.  We removed oxygen supplementation and O2 sat stayed around 90 to 91% on room air.  Recommend observational admission for bronchiolitis secondary to rhinovirus infection for aggressive pulmonary toilet with DuoNebs and IV Solu-Medrol.  Also recommend recheck on hyponatremia.  Discussed admission with Dr. Pan, hospitalist, and he is agreeable to admission on telemetry. [FC]      ED Course User Index  [FC] Briana Ch, ARIEL            Recent Results (from the past 24 hour(s))   Urinalysis With Culture If Indicated - Urine, Clean Catch    Collection Time: 03/04/23  1:27 AM    Specimen: Urine, Clean Catch   Result Value Ref Range    Color, UA Yellow Yellow, Straw    Appearance, UA Cloudy (A) Clear    pH, UA 5.5 5.0 - 8.0    Specific Gravity, UA >=1.030 1.001 - 1.030    Glucose, UA Negative Negative    Ketones, UA Trace (A) Negative     Bilirubin, UA Negative Negative    Blood, UA Trace (A) Negative    Protein, UA Trace (A) Negative    Leuk Esterase, UA Negative Negative    Nitrite, UA Negative Negative    Urobilinogen, UA 0.2 E.U./dL 0.2 - 1.0 E.U./dL   Urinalysis, Microscopic Only - Urine, Clean Catch    Collection Time: 03/04/23  1:27 AM    Specimen: Urine, Clean Catch   Result Value Ref Range    RBC, UA 7-12 (A) None Seen, 0-2 /HPF    WBC, UA 13-20 (A) None Seen, 0-2 /HPF    Bacteria, UA None Seen None Seen, Trace /HPF    Squamous Epithelial Cells, UA 7-12 (A) None Seen, 0-2 /HPF    Hyaline Casts, UA 0-6 0 - 6 /LPF    Methodology Automated Microscopy    ECG 12 Lead Dyspnea    Collection Time: 03/04/23  1:38 AM   Result Value Ref Range    QT Interval 366 ms    QTC Interval 442 ms   Lactate Dehydrogenase    Collection Time: 03/04/23  1:55 AM    Specimen: Blood   Result Value Ref Range     (H) 135 - 214 U/L   Procalcitonin    Collection Time: 03/04/23  1:55 AM    Specimen: Blood   Result Value Ref Range    Procalcitonin 0.02 0.00 - 0.25 ng/mL   C-reactive Protein    Collection Time: 03/04/23  1:55 AM    Specimen: Blood   Result Value Ref Range    C-Reactive Protein 0.69 (H) 0.00 - 0.50 mg/dL   D-dimer, Quantitative    Collection Time: 03/04/23  1:55 AM    Specimen: Blood   Result Value Ref Range    D-Dimer, Quantitative 0.27 0.00 - 0.50 MCGFEU/mL   CBC Auto Differential    Collection Time: 03/04/23  1:55 AM    Specimen: Blood   Result Value Ref Range    WBC 12.96 (H) 3.40 - 10.80 10*3/mm3    RBC 4.40 3.77 - 5.28 10*6/mm3    Hemoglobin 14.1 12.0 - 15.9 g/dL    Hematocrit 42.4 34.0 - 46.6 %    MCV 96.4 79.0 - 97.0 fL    MCH 32.0 26.6 - 33.0 pg    MCHC 33.3 31.5 - 35.7 g/dL    RDW 12.0 (L) 12.3 - 15.4 %    RDW-SD 42.4 37.0 - 54.0 fl    MPV 10.5 6.0 - 12.0 fL    Platelets 317 140 - 450 10*3/mm3    Neutrophil % 46.4 42.7 - 76.0 %    Lymphocyte % 40.3 19.6 - 45.3 %    Monocyte % 5.6 5.0 - 12.0 %    Eosinophil % 6.9 (H) 0.3 - 6.2 %    Basophil %  0.5 0.0 - 1.5 %    Immature Grans % 0.3 0.0 - 0.5 %    Neutrophils, Absolute 6.01 1.70 - 7.00 10*3/mm3    Lymphocytes, Absolute 5.22 (H) 0.70 - 3.10 10*3/mm3    Monocytes, Absolute 0.72 0.10 - 0.90 10*3/mm3    Eosinophils, Absolute 0.90 (H) 0.00 - 0.40 10*3/mm3    Basophils, Absolute 0.07 0.00 - 0.20 10*3/mm3    Immature Grans, Absolute 0.04 0.00 - 0.05 10*3/mm3    nRBC 0.0 0.0 - 0.2 /100 WBC   POC CHEM 8    Collection Time: 03/04/23  2:00 AM    Specimen: Blood   Result Value Ref Range    Glucose 107 70 - 130 mg/dL    BUN 6 (L) 8 - 26 mg/dL    Creatinine 0.60 0.60 - 1.30 mg/dL    Sodium 141 138 - 146 mmol/L    POC Potassium 3.4 (L) 3.5 - 4.9 mmol/L    Chloride 104 98 - 109 mmol/L    Total CO2 28 24 - 29 mmol/L    Hemoglobin 14.3 12.0 - 17.0 g/dL    Hematocrit 42 38 - 51 %    Ionized Calcium 1.25 1.20 - 1.32 mmol/L    eGFR 129.5 >60.0 mL/min/1.73   Respiratory Panel PCR w/COVID-19(SARS-CoV-2) MIGEL/LAZARO/JAVIER/PAD/COR/MAD/GINNY In-House, NP Swab in UTM/[facility], 3-4 HR TAT - Swab, Nasopharynx    Collection Time: 03/04/23  2:01 AM    Specimen: Nasopharynx; Swab   Result Value Ref Range    ADENOVIRUS, PCR Not Detected Not Detected    Coronavirus 229E Not Detected Not Detected    Coronavirus HKU1 Not Detected Not Detected    Coronavirus NL63 Not Detected Not Detected    Coronavirus OC43 Not Detected Not Detected    COVID19 Not Detected Not Detected - Ref. Range    Human Metapneumovirus Not Detected Not Detected    Human Rhinovirus/Enterovirus Detected (A) Not Detected    Influenza A PCR Not Detected Not Detected    Influenza B PCR Not Detected Not Detected    Parainfluenza Virus 1 Not Detected Not Detected    Parainfluenza Virus 2 Not Detected Not Detected    Parainfluenza Virus 3 Not Detected Not Detected    Parainfluenza Virus 4 Not Detected Not Detected    RSV, PCR Not Detected Not Detected    Bordetella pertussis pcr Not Detected Not Detected    Bordetella parapertussis PCR Not Detected Not Detected    Chlamydophila pneumoniae  PCR Not Detected Not Detected    Mycoplasma pneumo by PCR Not Detected Not Detected   POC Urine Pregnancy    Collection Time: 03/04/23  2:01 AM    Specimen: Urine   Result Value Ref Range    HCG, Urine, QL Negative Negative    Lot Number [id number]     Internal Positive Control Positive Positive, Passed    Internal Negative Control Negative Negative, Passed    Expiration Date 2/29/24    POC Chem 8    Collection Time: 03/04/23  2:06 AM    Specimen: Blood   Result Value Ref Range    Sodium 124 (A) 138 - 146 mmol/L    POC Potassium 3.4 (A) 3.5 - 4.9 mmol/L    Chloride 104 98 - 109 mmol/L    Calcium, Arterial 1.25 mg/dL    Glucose 107 70 - 130 mg/dL    BUN 6 mg/dL    Creatinine 0.60 mg/dL     Note: In addition to lab results from this visit, the labs listed above may include labs taken at another facility or during a different encounter within the last 24 hours. Please correlate lab times with ED admission and discharge times for further clarification of the services performed during this visit.    XR Chest 1 View   Final Result      1.  No acute cardiopulmonary abnormality detected.              Electronically signed by:  Jez Gonzáles M.D.     3/4/2023 1:03 AM Mountain Time        Vitals:    03/04/23 0145 03/04/23 0357 03/04/23 0429 03/04/23 0459   BP:       Pulse: 86 74 74 75   Resp: 18 16     Temp:       TempSrc:       SpO2: 96% 94% 100% 99%   Weight:       Height:         Medications   sodium chloride 0.9 % flush 10 mL (has no administration in time range)   sodium chloride 0.9 % flush 10 mL (has no administration in time range)   sodium chloride 0.9 % flush 10 mL (has no administration in time range)   sodium chloride 0.9 % infusion 40 mL (has no administration in time range)   acetaminophen (TYLENOL) tablet 650 mg (has no administration in time range)   ondansetron (ZOFRAN) injection 4 mg (has no administration in time range)   ipratropium-albuterol (DUO-NEB) nebulizer solution 3 mL (has no administration in  time range)   ipratropium-albuterol (DUO-NEB) nebulizer solution 3 mL (has no administration in time range)   methylPREDNISolone sodium succinate (SOLU-Medrol) injection 20 mg (has no administration in time range)   albuterol (PROVENTIL) nebulizer solution 0.083% 2.5 mg/3mL (2.5 mg Nebulization Given 3/4/23 0143)   sodium chloride 0.9 % bolus 1,000 mL (0 mL Intravenous Stopped 3/4/23 0518)   methylPREDNISolone sodium succinate (SOLU-Medrol) injection 80 mg (80 mg Intravenous Given 3/4/23 0419)     ECG/EMG Results (last 24 hours)     ** No results found for the last 24 hours. **        ECG 12 Lead Dyspnea   Preliminary Result   Test Reason : DYSPNEA   Blood Pressure :   */*   mmHG   Vent. Rate :  88 BPM     Atrial Rate :  88 BPM      P-R Int : 134 ms          QRS Dur :  84 ms       QT Int : 366 ms       P-R-T Axes :  33   7  15 degrees      QTc Int : 442 ms      Normal sinus rhythm   Normal ECG   No previous ECGs available      Referred By: EDMD           Confirmed By:                                             MDM    Final diagnoses:   Acute bronchiolitis due to other specified organisms   Acute respiratory failure with hypoxia (HCC)   Rhinovirus infection   Wheezing   History of asthma   Hyponatremia       ED Disposition  ED Disposition     ED Disposition   Decision to Admit    Condition   --    Comment   Level of Care: Telemetry [5]   Diagnosis: Moderate acute exacerbation of asthma [2428215]   Admitting Physician: RONNY ARAGON III [[id number]]   Attending Physician: RONNY ARAGON III [[id number]]   Bed Request Comments: tele obs               No follow-up provider specified.       Medication List      No changes were made to your prescriptions during this visit.         Briana Ch PA-C  03/04/23 1317

## 2023-03-05 VITALS
HEIGHT: 62 IN | DIASTOLIC BLOOD PRESSURE: 59 MMHG | WEIGHT: 170 LBS | RESPIRATION RATE: 18 BRPM | BODY MASS INDEX: 31.28 KG/M2 | HEART RATE: 82 BPM | SYSTOLIC BLOOD PRESSURE: 100 MMHG | OXYGEN SATURATION: 95 % | TEMPERATURE: 97.9 F

## 2023-03-05 LAB — BACTERIA SPEC AEROBE CULT: NORMAL

## 2023-03-05 PROCEDURE — G0378 HOSPITAL OBSERVATION PER HR: HCPCS

## 2023-03-05 PROCEDURE — 94664 DEMO&/EVAL PT USE INHALER: CPT

## 2023-03-05 PROCEDURE — 94799 UNLISTED PULMONARY SVC/PX: CPT

## 2023-03-05 PROCEDURE — 96376 TX/PRO/DX INJ SAME DRUG ADON: CPT

## 2023-03-05 PROCEDURE — 25010000002 METHYLPREDNISOLONE PER 40 MG: Performed by: INTERNAL MEDICINE

## 2023-03-05 PROCEDURE — 99239 HOSP IP/OBS DSCHRG MGMT >30: CPT | Performed by: INTERNAL MEDICINE

## 2023-03-05 PROCEDURE — 94761 N-INVAS EAR/PLS OXIMETRY MLT: CPT

## 2023-03-05 RX ORDER — PREDNISONE 10 MG/1
10 TABLET ORAL TAKE AS DIRECTED
Qty: 20 TABLET | Refills: 0 | Status: SHIPPED | OUTPATIENT
Start: 2023-03-05

## 2023-03-05 RX ORDER — ALBUTEROL SULFATE 90 UG/1
2 AEROSOL, METERED RESPIRATORY (INHALATION) EVERY 4 HOURS PRN
Qty: 18 G | Refills: 0 | Status: SHIPPED | OUTPATIENT
Start: 2023-03-05

## 2023-03-05 RX ORDER — FLUTICASONE PROPIONATE AND SALMETEROL 250; 50 UG/1; UG/1
1 POWDER RESPIRATORY (INHALATION) 2 TIMES DAILY
Qty: 60 EACH | Refills: 0 | Status: SHIPPED | OUTPATIENT
Start: 2023-03-05

## 2023-03-05 RX ADMIN — Medication 1 PATCH: at 09:47

## 2023-03-05 RX ADMIN — METHYLPREDNISOLONE SODIUM SUCCINATE 20 MG: 40 INJECTION, POWDER, FOR SOLUTION INTRAMUSCULAR; INTRAVENOUS at 06:55

## 2023-03-05 RX ADMIN — BUDESONIDE 0.5 MG: 0.5 SUSPENSION RESPIRATORY (INHALATION) at 07:22

## 2023-03-05 RX ADMIN — IPRATROPIUM BROMIDE AND ALBUTEROL SULFATE 3 ML: 2.5; .5 SOLUTION RESPIRATORY (INHALATION) at 07:21

## 2023-03-05 NOTE — DISCHARGE SUMMARY
[facility] Medicine Services  DISCHARGE SUMMARY    Patient Name: Jing Meda  : 2000  MRN: [id number]    Date of Admission: 3/4/2023  1:04 AM  Date of Discharge:  3/5/23  Primary Care Physician: Provider, No Known    Consults     No orders found for last 30 day(s).          Hospital Course     Presenting Problem:   Moderate acute exacerbation of asthma [J45.901]    Active Hospital Problems    Diagnosis  POA   • **Moderate acute exacerbation of asthma [J45.901]  Yes      Resolved Hospital Problems   No resolved problems to display.          Hospital Course:  Jing Mead is a 22 y.o. female with history of asthma and tobacco abuse presents with shortness of breath, wheezing and coughing. Symptoms began 2 days prior to coming to the ED.     Asthma exacerbation  Rhinovirus +  - solumedrol IV while hospitalized, will transition to a prednisone taper at discharge  - albuterol MDI PRN  - Advair BID  - will also refer to new PCP to establish care.  Needs PFTs as an outpatient.     Tobacco abuse  - counseled the importance of stopping smoking, particularly in the setting of asthma  - nicotine replacement provided.      Discharge Follow Up Recommendations for outpatient labs/diagnostics:      Day of Discharge     HPI:   Feeling better. Slight cough, non productive. Would like to go home today    Review of Systems  Gen: no fever  Pulm: no cough  GI: no nausea      Vital Signs:   Temp:  [97.8 °F (36.6 °C)-98.3 °F (36.8 °C)] 97.9 °F (36.6 °C)  Heart Rate:  [] 82  Resp:  [16-18] 18  BP: (100-119)/(55-97) 100/59  Flow (L/min):  [1] 1      Physical Exam:  Constitutional - no acute distress, nontoxic, in bed  HEENT-NCAT, mucous membranes moist  CV-RRR  Resp-good air movement bilaterally with a few scattered expiratory wheezes  Abd-soft, nontender, nondistended, normoactive bowel sounds  Ext-No lower extremity cyanosis, clubbing or edema bilaterally  Neuro-alert, speech clear, moves all  extremities   Psych-normal affect   Skin- No rash on exposed UE or LE bilaterally      Pertinent  and/or Most Recent Results     LAB RESULTS:      Lab 03/04/23  0616 03/04/23  0200 03/04/23  0155   WBC 11.26*  --  12.96*   HEMOGLOBIN 12.8  --  14.1   HEMOGLOBIN, POC  --  14.3  --    HEMATOCRIT 38.6  --  42.4   HEMATOCRIT POC  --  42  --    PLATELETS 302  --  317   NEUTROS ABS 7.66*  --  6.01   IMMATURE GRANS (ABS) 0.04  --  0.04   LYMPHS ABS 2.67  --  5.22*   MONOS ABS 0.32  --  0.72   EOS ABS 0.50*  --  0.90*   MCV 96.3  --  96.4   CRP  --   --  0.69*   PROCALCITONIN  --   --  0.02   LDH  --   --  283*   D DIMER QUANT  --   --  0.27         Lab 03/04/23  0616 03/04/23  0206 03/04/23  0200   SODIUM 140  --   --    POTASSIUM 4.0  --   --    CHLORIDE 107  --   --    CO2 22.0  --   --    ANION GAP 11.0  --   --    BUN 6  --   --    CREATININE 0.58 0.60 0.60   EGFR 130.6  --  129.5   GLUCOSE 112*  --   --    CALCIUM 8.4*  --   --    MAGNESIUM 1.9  --   --          Lab 03/04/23 0616   TOTAL PROTEIN 6.6   ALBUMIN 3.9   GLOBULIN 2.7   ALT (SGPT) 8   AST (SGOT) 14   BILIRUBIN 0.3   ALK PHOS 75                     Brief Urine Lab Results  (Last result in the past 365 days)      Color   Clarity   Blood   Leuk Est   Nitrite   Protein   CREAT   Urine HCG        03/04/23 0201               Negative           Microbiology Results (last 10 days)     Procedure Component Value - Date/Time    COVID PRE-OP / PRE-PROCEDURE SCREENING ORDER (NO ISOLATION) - Swab, Nasopharynx [[id number]]  (Abnormal) Collected: 03/04/23 0201    Lab Status: Final result Specimen: Swab from Nasopharynx Updated: 03/04/23 0325    Narrative:      The following orders were created for panel order COVID PRE-OP / PRE-PROCEDURE SCREENING ORDER (NO ISOLATION) - Swab, Nasopharynx.  Procedure                               Abnormality         Status                     ---------                               -----------         ------                     Respiratory  Panel PCR w/...[[id number]]  Abnormal            Final result                 Please view results for these tests on the individual orders.    Respiratory Panel PCR w/COVID-19(SARS-CoV-2) MIGEL/LAZARO/JAVIER/PAD/COR/MAD/GINNY In-House, NP Swab in UTM/VTM, 3-4 HR TAT - Swab, Nasopharynx [[id number]]  (Abnormal) Collected: 03/04/23 0201    Lab Status: Final result Specimen: Swab from Nasopharynx Updated: 03/04/23 0325     ADENOVIRUS, PCR Not Detected     Coronavirus 229E Not Detected     Coronavirus HKU1 Not Detected     Coronavirus NL63 Not Detected     Coronavirus OC43 Not Detected     COVID19 Not Detected     Human Metapneumovirus Not Detected     Human Rhinovirus/Enterovirus Detected     Influenza A PCR Not Detected     Influenza B PCR Not Detected     Parainfluenza Virus 1 Not Detected     Parainfluenza Virus 2 Not Detected     Parainfluenza Virus 3 Not Detected     Parainfluenza Virus 4 Not Detected     RSV, PCR Not Detected     Bordetella pertussis pcr Not Detected     Bordetella parapertussis PCR Not Detected     Chlamydophila pneumoniae PCR Not Detected     Mycoplasma pneumo by PCR Not Detected    Narrative:      In the setting of a positive respiratory panel with a viral infection PLUS a negative procalcitonin without other underlying concern for bacterial infection, consider observing off antibiotics or discontinuation of antibiotics and continue supportive care. If the respiratory panel is positive for atypical bacterial infection (Bordetella pertussis, Chlamydophila pneumoniae, or Mycoplasma pneumoniae), consider antibiotic de-escalation to target atypical bacterial infection.          XR Chest 1 View    Result Date: 3/4/2023  EXAMINATION: XR CHEST 1 VW  DATE OF EXAM: 3/4/2023 1:55 AM SLOT: 60 HISTORY: Shortness of breath COMPARISON: None. FINDINGS: HEART/MEDIASTINUM: Normal sized cardiac silhouette. LUNGS/PLEURA: Clear. MUSCULOSKELETAL: No acute osseous pathology.     1.  No acute cardiopulmonary abnormality  detected.  Electronically signed by:  Jez Gonzáles M.D.  3/4/2023 1:03 AM Mountain Time                  Plan for Follow-up of Pending Labs/Results:   Pending Labs     Order Current Status    Urine Culture - Urine, Urine, Clean Catch In process        Discharge Details        Discharge Medications      New Medications      Instructions Start Date   albuterol sulfate  (90 Base) MCG/ACT inhaler  Commonly known as: PROVENTIL HFA;VENTOLIN HFA;PROAIR HFA   2 puffs, Inhalation, Every 4 Hours PRN      Fluticasone-Salmeterol 250-50 MCG/ACT DISKUS  Commonly known as: ADVAIR/WIXELA   1 puff, Inhalation, 2 Times Daily      predniSONE 10 MG tablet  Commonly known as: DELTASONE   10 mg, Oral, Take As Directed, Take 4 tablets per day for 2 days, then take 3 tabs/day x 2 days, then 2 tabs/day x 2 days, then 1 tab/day x 2 days, then stop             No Known Allergies      Discharge Disposition:  Home or Self Care    Diet:  Hospital:  Diet Order   Procedures   • Diet: Regular/House Diet; Texture: Regular Texture (IDDSI 7); Fluid Consistency: Thin (IDDSI 0)       Activity:      Restrictions or Other Recommendations:         CODE STATUS:    Code Status and Medical Interventions:   Ordered at: 03/04/23 0419     Level Of Support Discussed With:    Patient     Code Status (Patient has no pulse and is not breathing):    CPR (Attempt to Resuscitate)     Medical Interventions (Patient has pulse or is breathing):    Full Support       No future appointments.    Additional Instructions for the Follow-ups that You Need to Schedule     Discharge Follow-up with PCP   As directed       Currently Documented PCP:    Provider, No Known    PCP Phone Number:    None     Follow Up Details: FOLLOW UP WITH NEW [facility] PRIMARY CARE PROVIDER IN ONE WEEK                     Oni Lopez MD  03/05/23      Time Spent on Discharge:  I spent  35  minutes on this discharge activity which included: face-to-face encounter with the patient,  reviewing the data in the system, coordination of the care with the nursing staff as well as consultants, documentation, and entering orders.

## 2023-03-05 NOTE — CASE MANAGEMENT/SOCIAL WORK
Continued Stay Note  [facility]     Patient Name: Jing Mead  MRN: [id number]  Today's Date: 3/5/2023    Admit Date: 3/4/2023        Discharge Plan     Row Name 03/05/23 1018       Plan    Final Discharge Disposition Code 01 - home or self-care               Discharge Codes    No documentation.               Expected Discharge Date and Time     Expected Discharge Date Expected Discharge Time    Mar 5, 2023             Cristin Gastelum RN

## 2023-06-10 ENCOUNTER — HOSPITAL ENCOUNTER (EMERGENCY)
Facility: HOSPITAL | Age: 23
Discharge: HOME | End: 2023-06-10
Attending: EMERGENCY MEDICINE
Payer: COMMERCIAL

## 2023-06-10 VITALS
HEART RATE: 110 BPM | RESPIRATION RATE: 20 BRPM | TEMPERATURE: 97.8 F | SYSTOLIC BLOOD PRESSURE: 128 MMHG | BODY MASS INDEX: 32.94 KG/M2 | HEIGHT: 62 IN | WEIGHT: 179 LBS | OXYGEN SATURATION: 100 % | DIASTOLIC BLOOD PRESSURE: 84 MMHG

## 2023-06-10 DIAGNOSIS — L55.9 SUNBURN: Primary | ICD-10-CM

## 2023-06-10 PROCEDURE — 99283 EMERGENCY DEPT VISIT LOW MDM: CPT

## 2023-06-10 RX ORDER — IBUPROFEN 800 MG/1
800 TABLET ORAL ONCE
Status: COMPLETED | OUTPATIENT
Start: 2023-06-10 | End: 2023-06-10

## 2023-06-10 RX ADMIN — IBUPROFEN 800 MG: 800 TABLET, FILM COATED ORAL at 23:19

## 2023-06-11 NOTE — DISCHARGE INSTRUCTIONS
Alternate ibuprofen and Tylenol for pain.  You can take 800 mg of ibuprofen 3 times daily with food, 500 mg of Tylenol every 4 hours.  Use cool compresses.  May continue aloe vera with lidocaine. Can use dermaplast as directed as well. Skin may peel but should heal on its own.  Follow-up with primary care provider as needed.  Return to the ER for any change, worsening of symptoms, or any additional concerns.   No